# Patient Record
Sex: FEMALE | Race: WHITE | Employment: OTHER | URBAN - METROPOLITAN AREA
[De-identification: names, ages, dates, MRNs, and addresses within clinical notes are randomized per-mention and may not be internally consistent; named-entity substitution may affect disease eponyms.]

---

## 2017-01-03 ENCOUNTER — TELEPHONE (OUTPATIENT)
Dept: ONCOLOGY | Age: 77
End: 2017-01-03

## 2017-01-03 NOTE — TELEPHONE ENCOUNTER
01/03/17- Phone call placed to patient- spoke to her son, Eugenia Squires he reported she is still at [facility]. \" She is doing good, no longer having pain, breathing easier and her tumor has shrunk\". She is leaving the hospital tomorrow and he stated to cancel upcoming appointment with . He expressed gratitude for the phone call and will update his mother. No further questions or concerns at this time.

## 2017-01-05 ENCOUNTER — APPOINTMENT (OUTPATIENT)
Dept: INFUSION THERAPY | Age: 77
End: 2017-01-05

## 2017-01-06 ENCOUNTER — HOSPITAL ENCOUNTER (OUTPATIENT)
Dept: INFUSION THERAPY | Age: 77
Discharge: HOME | End: 2017-01-06

## 2017-01-06 RX ORDER — SODIUM CHLORIDE 0.9 % (FLUSH) 0.9 %
10 SYRINGE (ML) INJECTION AS NEEDED
Status: ACTIVE | OUTPATIENT
Start: 2017-01-06 | End: 2017-01-07

## 2017-01-06 RX ORDER — HEPARIN 100 UNIT/ML
500 SYRINGE INTRAVENOUS AS NEEDED
Status: ACTIVE | OUTPATIENT
Start: 2017-01-06 | End: 2017-01-07

## 2017-01-06 RX ORDER — SODIUM CHLORIDE 9 MG/ML
10 INJECTION INTRAMUSCULAR; INTRAVENOUS; SUBCUTANEOUS AS NEEDED
Status: ACTIVE | OUTPATIENT
Start: 2017-01-06 | End: 2017-01-07

## 2017-01-06 NOTE — PROGRESS NOTES
[facility] VISIT NOTE    ***  Pt arrived at [facility] ambulatory and in no distress for port flush and labs. *** chest port accessed with *** in lua no difficulty. Positive blood return noted and labs drawn. Port de-accessed and flushed per protocol. ***  D/C'd from [facility] ambulatory and in no distress accompanied by ***.  Next appointment is 2/2/17 at 2pm.

## 2017-01-24 ENCOUNTER — TELEPHONE (OUTPATIENT)
Dept: ONCOLOGY | Age: 77
End: 2017-01-24

## 2017-01-24 NOTE — TELEPHONE ENCOUNTER
[employer]  Medical Oncology at [address] [place] [address]     01/24/17- Leukemia and Lymphoma co-pay assistance program enrollment application for [employer]. Physician forms completed and faxed back to [phone]- fax confirmation delivery successful.    01/26/17-After reviewing chart review patient is being seen by Nadege Vizcarra and will not be on medication at this time- per Messi Ghosh. NP. Spoke to patients daughter in law she reported patient is doing well- se has received inpatient chemotherapy. At home now- continues to have cough but overall feeling okay. Dr. Beckham Repress office is working on getting I[employer] for patient. No further questions or concerns.

## 2017-02-02 ENCOUNTER — HOSPITAL ENCOUNTER (OUTPATIENT)
Dept: INFUSION THERAPY | Age: 77
Discharge: HOME | End: 2017-02-02

## 2017-02-02 RX ORDER — SODIUM CHLORIDE 9 MG/ML
10 INJECTION INTRAMUSCULAR; INTRAVENOUS; SUBCUTANEOUS AS NEEDED
Status: CANCELLED | OUTPATIENT
Start: 2017-02-02 | End: 2017-02-03

## 2017-02-02 RX ORDER — HEPARIN 100 UNIT/ML
500 SYRINGE INTRAVENOUS AS NEEDED
Status: CANCELLED | OUTPATIENT
Start: 2017-02-02 | End: 2017-02-03

## 2017-02-02 RX ORDER — SODIUM CHLORIDE 0.9 % (FLUSH) 0.9 %
10-40 SYRINGE (ML) INJECTION AS NEEDED
Status: CANCELLED | OUTPATIENT
Start: 2017-02-02

## 2017-03-02 ENCOUNTER — APPOINTMENT (OUTPATIENT)
Dept: INFUSION THERAPY | Age: 77
End: 2017-03-02

## 2017-03-30 ENCOUNTER — HOSPITAL ENCOUNTER (OUTPATIENT)
Dept: INFUSION THERAPY | Age: 77
End: 2017-03-30

## 2017-04-27 ENCOUNTER — APPOINTMENT (OUTPATIENT)
Dept: INFUSION THERAPY | Age: 77
End: 2017-04-27

## 2017-05-25 ENCOUNTER — HOSPITAL ENCOUNTER (OUTPATIENT)
Dept: INFUSION THERAPY | Age: 77
Discharge: HOME | End: 2017-05-25

## 2017-05-25 RX ORDER — SODIUM CHLORIDE 0.9 % (FLUSH) 0.9 %
5-10 SYRINGE (ML) INJECTION AS NEEDED
Status: ACTIVE | OUTPATIENT
Start: 2017-05-25 | End: 2017-05-26

## 2017-05-25 RX ORDER — SODIUM CHLORIDE 9 MG/ML
10 INJECTION INTRAMUSCULAR; INTRAVENOUS; SUBCUTANEOUS AS NEEDED
Status: ACTIVE | OUTPATIENT
Start: 2017-05-25 | End: 2017-05-26

## 2017-05-25 RX ORDER — HEPARIN 100 UNIT/ML
500 SYRINGE INTRAVENOUS AS NEEDED
Status: ACTIVE | OUTPATIENT
Start: 2017-05-25 | End: 2017-05-26

## 2017-05-25 NOTE — PROGRESS NOTES
[facility] Short Visit Progress Note    1400 Pt admit to [facility] for port flush ambulatory in stable condition. Assessment completed. No new concerns voiced. Visit Vitals    LMP  (Exact Date)       PAC with positive blood return and flush, labs drawn, de accessed. bandaid applied. Medications:  ***    *** Pt tolerated treatment well. D/c home ambulatory in no distress. Pt aware of next appointment scheduled for ***.